# Patient Record
Sex: MALE | Employment: UNEMPLOYED | URBAN - METROPOLITAN AREA
[De-identification: names, ages, dates, MRNs, and addresses within clinical notes are randomized per-mention and may not be internally consistent; named-entity substitution may affect disease eponyms.]

---

## 2024-06-24 ENCOUNTER — OFFICE VISIT (OUTPATIENT)
Dept: ENDOCRINOLOGY | Age: 44
End: 2024-06-24

## 2024-06-24 VITALS
HEART RATE: 88 BPM | OXYGEN SATURATION: 97 % | DIASTOLIC BLOOD PRESSURE: 69 MMHG | HEIGHT: 65 IN | SYSTOLIC BLOOD PRESSURE: 102 MMHG | BODY MASS INDEX: 34.16 KG/M2 | WEIGHT: 205 LBS

## 2024-06-24 DIAGNOSIS — E11.65 INADEQUATELY CONTROLLED DIABETES MELLITUS (HCC): Primary | ICD-10-CM

## 2024-06-24 LAB
CHP ED QC CHECK: NORMAL
GLUCOSE BLD-MCNC: 227 MG/DL
HBA1C MFR BLD: 8.2 %

## 2024-06-24 PROCEDURE — 83036 HEMOGLOBIN GLYCOSYLATED A1C: CPT | Performed by: INTERNAL MEDICINE

## 2024-06-24 PROCEDURE — 99202 OFFICE O/P NEW SF 15 MIN: CPT | Performed by: INTERNAL MEDICINE

## 2024-06-24 PROCEDURE — 82962 GLUCOSE BLOOD TEST: CPT | Performed by: INTERNAL MEDICINE

## 2024-06-24 PROCEDURE — 3052F HG A1C>EQUAL 8.0%<EQUAL 9.0%: CPT | Performed by: INTERNAL MEDICINE

## 2024-06-24 RX ORDER — GLIMEPIRIDE 4 MG/1
4 TABLET ORAL 2 TIMES DAILY
COMMUNITY

## 2024-06-24 RX ORDER — ROSUVASTATIN CALCIUM 40 MG/1
40 TABLET, COATED ORAL EVERY EVENING
COMMUNITY

## 2024-06-24 RX ORDER — ASPIRIN 81 MG/1
81 TABLET ORAL DAILY
COMMUNITY

## 2024-06-24 RX ORDER — FENOFIBRATE 160 MG/1
160 TABLET ORAL DAILY
COMMUNITY

## 2024-06-24 RX ORDER — PIOGLITAZONEHYDROCHLORIDE 15 MG/1
15 TABLET ORAL DAILY
COMMUNITY

## 2024-06-24 NOTE — PROGRESS NOTES
Take 1 tablet by mouth 2 times daily (with meals), Disp: , Rfl:     rosuvastatin (CRESTOR) 40 MG tablet, Take 1 tablet by mouth every evening, Disp: , Rfl:   No results found for: \"NA\", \"K\", \"CL\", \"CO2\", \"BUN\", \"CREATININE\", \"GLUCOSE\", \"CALCIUM\", \"PROT\", \"LABALBU\", \"BILITOT\", \"ALKPHOS\", \"AST\", \"ALT\", \"LABGLOM\", \"GFRAA\", \"AGRATIO\", \"GLOB\"  No results found for: \"WBC\", \"HGB\", \"HCT\", \"MCV\", \"PLT\"  Lab Results   Component Value Date    LABA1C 8.2 06/24/2024     No results found for: \"CHOLFAST\", \"TRIGLYCFAST\", \"HDL\", \"CHOL\", \"TRIG\"  No results found for: \"TESTM\"  No results found for: \"TSH\", \"FT3\", \"T4FREE\"  No results found for: \"TPOABS\"    Review of Systems   Cardiovascular: Negative.    Endocrine: Negative for polydipsia and polyuria.   Psychiatric/Behavioral:  Positive for dysphoric mood.    All other systems reviewed and are negative.      Objective:   Physical Exam  Vitals reviewed.   Constitutional:       General: He is not in acute distress.     Appearance: Normal appearance. He is obese.   HENT:      Head: Normocephalic and atraumatic.      Right Ear: External ear normal.      Left Ear: External ear normal.      Nose: Nose normal.   Eyes:      General: No scleral icterus.        Right eye: No discharge.         Left eye: No discharge.      Extraocular Movements: Extraocular movements intact.      Conjunctiva/sclera: Conjunctivae normal.   Cardiovascular:      Rate and Rhythm: Normal rate and regular rhythm.      Heart sounds: Normal heart sounds.   Pulmonary:      Effort: Pulmonary effort is normal.      Breath sounds: Normal breath sounds.   Abdominal:      Palpations: Abdomen is soft.   Musculoskeletal:         General: Normal range of motion.      Cervical back: Normal range of motion and neck supple.      Right lower leg: No edema.      Left lower leg: No edema.        Feet:    Skin:     General: Skin is warm and dry.   Neurological:      General: No focal deficit present.      Mental Status: He is alert and

## 2024-09-06 DIAGNOSIS — E11.65 INADEQUATELY CONTROLLED DIABETES MELLITUS (HCC): ICD-10-CM

## 2024-09-06 LAB
ANION GAP SERPL CALCULATED.3IONS-SCNC: 12 MEQ/L (ref 9–15)
BUN SERPL-MCNC: 19 MG/DL (ref 6–20)
CALCIUM SERPL-MCNC: 9.6 MG/DL (ref 8.5–9.9)
CHLORIDE SERPL-SCNC: 102 MEQ/L (ref 95–107)
CHOLEST SERPL-MCNC: 118 MG/DL (ref 0–199)
CO2 SERPL-SCNC: 24 MEQ/L (ref 20–31)
CREAT SERPL-MCNC: 1.08 MG/DL (ref 0.7–1.2)
CREAT UR-MCNC: 157.9 MG/DL
ESTIMATED AVERAGE GLUCOSE: 214 MG/DL
GLUCOSE SERPL-MCNC: 127 MG/DL (ref 70–99)
HBA1C MFR BLD: 9.1 % (ref 4–6)
HDLC SERPL-MCNC: 46 MG/DL (ref 40–59)
LDLC SERPL CALC-MCNC: 52 MG/DL (ref 0–129)
MICROALBUMIN UR-MCNC: 2.6 MG/DL
MICROALBUMIN/CREAT UR-RTO: 16.5 MG/G (ref 0–30)
POTASSIUM SERPL-SCNC: 4.2 MEQ/L (ref 3.4–4.9)
SODIUM SERPL-SCNC: 138 MEQ/L (ref 135–144)
TRIGL SERPL-MCNC: 100 MG/DL (ref 0–150)

## 2025-01-02 ENCOUNTER — OFFICE VISIT (OUTPATIENT)
Dept: ENDOCRINOLOGY | Age: 45
End: 2025-01-02

## 2025-01-02 VITALS
BODY MASS INDEX: 34.53 KG/M2 | SYSTOLIC BLOOD PRESSURE: 127 MMHG | WEIGHT: 207.23 LBS | DIASTOLIC BLOOD PRESSURE: 81 MMHG | OXYGEN SATURATION: 98 % | HEIGHT: 65 IN

## 2025-01-02 DIAGNOSIS — E11.65 INADEQUATELY CONTROLLED DIABETES MELLITUS (HCC): Primary | ICD-10-CM

## 2025-01-02 LAB
CHP ED QC CHECK: NORMAL
GLUCOSE BLD-MCNC: 269 MG/DL

## 2025-01-02 NOTE — PROGRESS NOTES
Spouse name: Not on file    Number of children: Not on file    Years of education: Not on file    Highest education level: Not on file   Occupational History    Not on file   Tobacco Use    Smoking status: Never     Passive exposure: Never    Smokeless tobacco: Never   Vaping Use    Vaping status: Never Used   Substance and Sexual Activity    Alcohol use: Not on file    Drug use: Never    Sexual activity: Not on file   Other Topics Concern    Not on file   Social History Narrative    Not on file     Social Determinants of Health     Financial Resource Strain: Not on file   Food Insecurity: Not on file   Transportation Needs: Not on file   Physical Activity: Not on file   Stress: Not on file   Social Connections: Not on file   Intimate Partner Violence: Not on file   Housing Stability: Not on file     No family history on file.  No Known Allergies    Current Outpatient Medications:     pioglitazone (ACTOS) 15 MG tablet, Take 1 tablet by mouth daily, Disp: , Rfl:     glimepiride (AMARYL) 4 MG tablet, Take 1 tablet by mouth in the morning and at bedtime, Disp: , Rfl:     aspirin 81 MG EC tablet, Take 1 tablet by mouth daily, Disp: , Rfl:     fenofibrate (TRIGLIDE) 160 MG tablet, Take 1 tablet by mouth daily, Disp: , Rfl:     metFORMIN (GLUCOPHAGE) 1000 MG tablet, Take 1 tablet by mouth 2 times daily (with meals), Disp: , Rfl:     rosuvastatin (CRESTOR) 40 MG tablet, Take 1 tablet by mouth every evening, Disp: , Rfl:   Lab Results   Component Value Date     09/06/2024    K 4.2 09/06/2024     09/06/2024    CO2 24 09/06/2024    BUN 19 09/06/2024    CREATININE 1.08 09/06/2024    GLUCOSE 127 (H) 09/06/2024    CALCIUM 9.6 09/06/2024    LABGLOM 87.1 09/06/2024     No results found for: \"WBC\", \"HGB\", \"HCT\", \"MCV\", \"PLT\"  Lab Results   Component Value Date    LABA1C 9.1 (H) 09/06/2024    LABA1C 8.2 06/24/2024     Lab Results   Component Value Date    HDL 46 09/06/2024    CHOL 118 09/06/2024    TRIG 100

## 2025-04-08 ENCOUNTER — OFFICE VISIT (OUTPATIENT)
Age: 45
End: 2025-04-08

## 2025-04-08 VITALS
BODY MASS INDEX: 34.53 KG/M2 | DIASTOLIC BLOOD PRESSURE: 68 MMHG | OXYGEN SATURATION: 96 % | SYSTOLIC BLOOD PRESSURE: 106 MMHG | HEART RATE: 70 BPM | HEIGHT: 65 IN | WEIGHT: 207.23 LBS

## 2025-04-08 DIAGNOSIS — E11.65 INADEQUATELY CONTROLLED DIABETES MELLITUS (HCC): Primary | ICD-10-CM

## 2025-04-08 LAB
CHP ED QC CHECK: NORMAL
GLUCOSE BLD-MCNC: 162 MG/DL
HBA1C MFR BLD: 8.4 %

## 2025-04-08 PROCEDURE — PBSHW POCT GLYCOSYLATED HEMOGLOBIN (HGB A1C): Performed by: INTERNAL MEDICINE

## 2025-04-08 PROCEDURE — 83036 HEMOGLOBIN GLYCOSYLATED A1C: CPT | Performed by: INTERNAL MEDICINE

## 2025-04-08 PROCEDURE — 99211 OFF/OP EST MAY X REQ PHY/QHP: CPT | Performed by: INTERNAL MEDICINE

## 2025-04-08 PROCEDURE — 3052F HG A1C>EQUAL 8.0%<EQUAL 9.0%: CPT | Performed by: INTERNAL MEDICINE

## 2025-04-08 PROCEDURE — PBSHW POCT GLUCOSE: Performed by: INTERNAL MEDICINE

## 2025-04-08 PROCEDURE — 82962 GLUCOSE BLOOD TEST: CPT | Performed by: INTERNAL MEDICINE

## 2025-04-08 PROCEDURE — 99212 OFFICE O/P EST SF 10 MIN: CPT | Performed by: INTERNAL MEDICINE

## 2025-04-08 NOTE — PROGRESS NOTES
4/8/2025    Assessment:       Diagnosis Orders   1. Inadequately controlled diabetes mellitus (HCC)  POCT Glucose    POCT glycosylated hemoglobin (Hb A1C)            PLAN:     Orders Placed This Encounter   Procedures    Hemoglobin A1C     Standing Status:   Future     Expected Date:   4/8/2025     Expiration Date:   4/8/2026    Basic Metabolic Panel     Standing Status:   Future     Expected Date:   4/8/2025     Expiration Date:   4/8/2026    POCT Glucose    POCT glycosylated hemoglobin (Hb A1C)     Continue current medication regimen for follow-up diabetes follow-up in 6 months A1c goal of less than 7      Orders Placed This Encounter   Procedures    POCT Glucose    POCT glycosylated hemoglobin (Hb A1C)     No orders of the defined types were placed in this encounter.    No follow-ups on file.  Subjective:     Chief Complaint   Patient presents with    Diabetes     Vitals:    04/08/25 0950   BP: 106/68   Pulse: 70   SpO2: 96%   Weight: 94 kg (207 lb 3.7 oz)   Height: 1.651 m (5' 5\")     Wt Readings from Last 3 Encounters:   04/08/25 94 kg (207 lb 3.7 oz)   01/02/25 94 kg (207 lb 3.7 oz)   06/24/24 93 kg (205 lb)     BP Readings from Last 3 Encounters:   04/08/25 106/68   01/02/25 127/81   06/24/24 102/69     Follow-up on type 2 diabetes obesity patient on metformin 1000 mg twice daily glimepiride 4 mg twice daily pioglitazone 15 mg daily A1c was 8.4 average glucose close to 200  Hemoglobin A1C       Date                     Value               Ref Range           Status                04/08/2025               8.4                 %                   Final            ----------      Diabetes  He presents for his follow-up diabetic visit. He has type 2 diabetes mellitus. Risk factors for coronary artery disease include obesity and stress. Current diabetic treatment includes oral agent (triple therapy). He is following a generally healthy diet. His overall blood glucose range is 180-200 mg/dl.     History reviewed. No